# Patient Record
Sex: MALE | Race: WHITE | NOT HISPANIC OR LATINO | URBAN - METROPOLITAN AREA
[De-identification: names, ages, dates, MRNs, and addresses within clinical notes are randomized per-mention and may not be internally consistent; named-entity substitution may affect disease eponyms.]

---

## 2017-07-13 ENCOUNTER — IMPORTED ENCOUNTER (OUTPATIENT)
Dept: URBAN - METROPOLITAN AREA CLINIC 38 | Facility: CLINIC | Age: 37
End: 2017-07-13

## 2017-07-13 PROBLEM — H18.821 CORNEAL DISORDER DUE TO CONTACT LENS, RIGHT EYE: Noted: 2017-07-13

## 2017-07-13 PROCEDURE — 92012 INTRM OPH EXAM EST PATIENT: CPT

## 2017-07-24 ENCOUNTER — IMPORTED ENCOUNTER (OUTPATIENT)
Dept: URBAN - METROPOLITAN AREA CLINIC 38 | Facility: CLINIC | Age: 37
End: 2017-07-24

## 2017-07-24 PROBLEM — H52.13 MYOPIA, BILATERAL: Noted: 2017-07-24

## 2017-07-24 PROCEDURE — 92015 DETERMINE REFRACTIVE STATE: CPT

## 2020-01-20 ENCOUNTER — IMPORTED ENCOUNTER (OUTPATIENT)
Dept: URBAN - METROPOLITAN AREA CLINIC 38 | Facility: CLINIC | Age: 40
End: 2020-01-20

## 2020-01-20 PROBLEM — H52.223 REGULAR ASTIGMATISM, BILATERAL: Noted: 2020-01-20

## 2020-01-20 PROCEDURE — 92015 DETERMINE REFRACTIVE STATE: CPT

## 2020-08-12 ENCOUNTER — IMPORTED ENCOUNTER (OUTPATIENT)
Dept: URBAN - METROPOLITAN AREA CLINIC 38 | Facility: CLINIC | Age: 40
End: 2020-08-12

## 2020-08-12 PROBLEM — H10.45 OTHER CHRONIC ALLERGIC CONJUNCTIVITIS: Noted: 2020-08-12

## 2020-08-12 PROCEDURE — 92012 INTRM OPH EXAM EST PATIENT: CPT

## 2020-09-28 ENCOUNTER — IMPORTED ENCOUNTER (OUTPATIENT)
Dept: URBAN - METROPOLITAN AREA CLINIC 38 | Facility: CLINIC | Age: 40
End: 2020-09-28

## 2020-09-28 PROBLEM — H18.821 CORNEAL DISORDER DUE TO CONTACT LENS, RIGHT EYE: Noted: 2020-09-28

## 2020-09-28 PROCEDURE — 92012 INTRM OPH EXAM EST PATIENT: CPT

## 2020-10-12 ENCOUNTER — IMPORTED ENCOUNTER (OUTPATIENT)
Dept: URBAN - METROPOLITAN AREA CLINIC 38 | Facility: CLINIC | Age: 40
End: 2020-10-12

## 2020-10-12 PROBLEM — H18.821 CORNEAL DISORDER DUE TO CONTACT LENS, RIGHT EYE: Noted: 2020-10-12

## 2020-10-12 PROCEDURE — 92012 INTRM OPH EXAM EST PATIENT: CPT

## 2020-11-12 ENCOUNTER — IMPORTED ENCOUNTER (OUTPATIENT)
Dept: URBAN - METROPOLITAN AREA CLINIC 38 | Facility: CLINIC | Age: 40
End: 2020-11-12

## 2020-11-12 PROBLEM — H18.821 CORNEAL DISORDER DUE TO CONTACT LENS, RIGHT EYE: Noted: 2020-11-12

## 2020-11-12 PROCEDURE — 92012 INTRM OPH EXAM EST PATIENT: CPT

## 2021-05-03 ENCOUNTER — IMPORTED ENCOUNTER (OUTPATIENT)
Dept: URBAN - METROPOLITAN AREA CLINIC 38 | Facility: CLINIC | Age: 41
End: 2021-05-03

## 2021-05-03 PROBLEM — H52.223 REGULAR ASTIGMATISM, BILATERAL: Noted: 2021-05-03

## 2021-05-03 PROCEDURE — 92015 DETERMINE REFRACTIVE STATE: CPT

## 2022-06-18 ASSESSMENT — VISUAL ACUITY
OD_CC: 20/20-1
OD_CC: 20/25
OS_CC: 20/20-
OD_CC: 20/25-1
OS_CC: 20/20
OS_CC: 20/20-1
OS_CC: 20/20-1
OS_CC: 20/20
OS_CC: J1
OD_CC: J1
OS_CC: 20/25+
OS_CC: 20/20
OD_CC: 20/20-
OD_CC: 20/20
OS_CC: J1
OS_CC: 20/25+
OD_CC: 20/25+
OD_CC: 20/20
OD_CC: 20/20
OS_CC: J1
OD_CC: J2
OD_CC: J1

## 2022-06-18 ASSESSMENT — TONOMETRY
OS_IOP_MMHG: 14
OD_IOP_MMHG: 18
OD_IOP_MMHG: 16
OS_IOP_MMHG: 15
OS_IOP_MMHG: 16
OD_IOP_MMHG: 17
OS_IOP_MMHG: 17
OS_IOP_MMHG: 19
OD_IOP_MMHG: 16
OD_IOP_MMHG: 15
OS_IOP_MMHG: 14
OS_IOP_MMHG: 18
OD_IOP_MMHG: 15
OD_IOP_MMHG: 13
OS_IOP_MMHG: 18
OD_IOP_MMHG: 16

## 2022-06-18 ASSESSMENT — KERATOMETRY
OS_K2POWER_DIOPTERS: 42.75
OS_AXISANGLE2_DEGREES: 82
OD_AXISANGLE2_DEGREES: 103
OD_AXISANGLE2_DEGREES: 107
OD_K1POWER_DIOPTERS: 41.75
OD_K2POWER_DIOPTERS: 42.25
OD_K1POWER_DIOPTERS: 41.50
OS_AXISANGLE2_DEGREES: 82
OS_K1POWER_DIOPTERS: 41.75
OD_K1POWER_DIOPTERS: 41.50
OS_AXISANGLE_DEGREES: 172
OD_AXISANGLE_DEGREES: 15
OS_K2POWER_DIOPTERS: 43.25
OS_AXISANGLE_DEGREES: 170
OD_AXISANGLE2_DEGREES: 105
OS_AXISANGLE2_DEGREES: 80
OD_AXISANGLE_DEGREES: 13
OS_AXISANGLE_DEGREES: 172
OD_K2POWER_DIOPTERS: 42.50
OS_K1POWER_DIOPTERS: 42.00
OS_K1POWER_DIOPTERS: 42.00
OS_K2POWER_DIOPTERS: 42.75
OD_AXISANGLE_DEGREES: 17
OD_K2POWER_DIOPTERS: 42.75

## 2023-02-03 ENCOUNTER — PROBLEM (OUTPATIENT)
Dept: URBAN - METROPOLITAN AREA CLINIC 68 | Facility: CLINIC | Age: 43
End: 2023-02-03

## 2023-02-03 DIAGNOSIS — L71.9: ICD-10-CM

## 2023-02-03 PROCEDURE — 92012 INTRM OPH EXAM EST PATIENT: CPT

## 2023-02-03 ASSESSMENT — VISUAL ACUITY
OS_CC: 20/25-1
OD_CC: 20/20

## 2023-02-03 ASSESSMENT — KERATOMETRY
OS_K1POWER_DIOPTERS: 41.75
OS_K2POWER_DIOPTERS: 42.75
OS_AXISANGLE_DEGREES: 172
OS_AXISANGLE2_DEGREES: 82
OD_K2POWER_DIOPTERS: 42.25
OD_AXISANGLE_DEGREES: 17
OD_K1POWER_DIOPTERS: 41.50
OD_AXISANGLE2_DEGREES: 107

## 2023-02-03 ASSESSMENT — TONOMETRY
OD_IOP_MMHG: 16
OS_IOP_MMHG: 18

## 2023-05-05 ENCOUNTER — ESTABLISHED COMPREHENSIVE EXAM (OUTPATIENT)
Dept: URBAN - METROPOLITAN AREA CLINIC 68 | Facility: CLINIC | Age: 43
End: 2023-05-05

## 2023-05-05 DIAGNOSIS — H52.13: ICD-10-CM

## 2023-05-05 DIAGNOSIS — H52.223: ICD-10-CM

## 2023-05-05 PROCEDURE — 92015 DETERMINE REFRACTIVE STATE: CPT

## 2023-05-05 PROCEDURE — 92310 CONTACT LENS FITTING OU: CPT

## 2023-05-05 PROCEDURE — 92014 COMPRE OPH EXAM EST PT 1/>: CPT

## 2023-05-05 ASSESSMENT — TONOMETRY
OD_IOP_MMHG: 14
OS_IOP_MMHG: 18

## 2023-05-05 ASSESSMENT — VISUAL ACUITY
OS_CC: 20/20
OS_CC: J1
OD_CC: J1
OD_CC: 20/20-1

## 2024-11-07 ENCOUNTER — ANESTHESIA EVENT (OUTPATIENT)
Dept: ENDOSCOPY | Facility: HOSPITAL | Age: 44
End: 2024-11-07
Payer: COMMERCIAL

## 2024-11-07 NOTE — ANESTHESIA PREPROCEDURE EVALUATION
Relevant Problems   No relevant active problems       ROS/Med Hx     No past surgical history on file.    Physical Exam    Anesthesia Plan    Plan: MAC       2 ASA

## 2024-11-08 ENCOUNTER — HOSPITAL ENCOUNTER (OUTPATIENT)
Facility: HOSPITAL | Age: 44
Discharge: HOME | End: 2024-11-08
Attending: COLON & RECTAL SURGERY | Admitting: COLON & RECTAL SURGERY
Payer: COMMERCIAL

## 2024-11-08 ENCOUNTER — ANESTHESIA (OUTPATIENT)
Dept: ENDOSCOPY | Facility: HOSPITAL | Age: 44
End: 2024-11-08
Payer: COMMERCIAL

## 2024-11-08 VITALS
OXYGEN SATURATION: 100 % | TEMPERATURE: 98.7 F | DIASTOLIC BLOOD PRESSURE: 66 MMHG | RESPIRATION RATE: 18 BRPM | SYSTOLIC BLOOD PRESSURE: 142 MMHG | BODY MASS INDEX: 28.14 KG/M2 | HEART RATE: 56 BPM | HEIGHT: 71 IN | WEIGHT: 201 LBS

## 2024-11-08 PROCEDURE — 63600000 HC DRUGS/DETAIL CODE: Mod: JZ,TB

## 2024-11-08 PROCEDURE — 37000002 HC ANESTHESIA MAC: Performed by: COLON & RECTAL SURGERY

## 2024-11-08 PROCEDURE — 25800000 HC PHARMACY IV SOLUTIONS

## 2024-11-08 PROCEDURE — 71000001 HC PACU PHASE 1 INITIAL 30MIN: Performed by: COLON & RECTAL SURGERY

## 2024-11-08 PROCEDURE — 71000011 HC PACU PHASE 1 EA ADDL MIN: Performed by: COLON & RECTAL SURGERY

## 2024-11-08 PROCEDURE — 0WHP8YZ INSERTION OF OTHER DEVICE INTO GASTROINTESTINAL TRACT, VIA NATURAL OR ARTIFICIAL OPENING ENDOSCOPIC: ICD-10-PCS | Performed by: COLON & RECTAL SURGERY

## 2024-11-08 PROCEDURE — 25000000 HC PHARMACY GENERAL

## 2024-11-08 PROCEDURE — 75000014 HC COLONSCOPY DIAGNOSTIC: Performed by: COLON & RECTAL SURGERY

## 2024-11-08 RX ORDER — PROPOFOL 10 MG/ML
INJECTION, EMULSION INTRAVENOUS CONTINUOUS PRN
Status: DISCONTINUED | OUTPATIENT
Start: 2024-11-08 | End: 2024-11-08 | Stop reason: SURG

## 2024-11-08 RX ORDER — MULTIVITAMIN
1 TABLET ORAL DAILY
COMMUNITY

## 2024-11-08 RX ORDER — PROPOFOL 200MG/20ML
SYRINGE (ML) INTRAVENOUS AS NEEDED
Status: DISCONTINUED | OUTPATIENT
Start: 2024-11-08 | End: 2024-11-08 | Stop reason: SURG

## 2024-11-08 RX ORDER — LIDOCAINE HYDROCHLORIDE 10 MG/ML
INJECTION, SOLUTION EPIDURAL; INFILTRATION; INTRACAUDAL; PERINEURAL AS NEEDED
Status: DISCONTINUED | OUTPATIENT
Start: 2024-11-08 | End: 2024-11-08 | Stop reason: SURG

## 2024-11-08 RX ORDER — SODIUM CHLORIDE 9 MG/ML
INJECTION, SOLUTION INTRAVENOUS CONTINUOUS
Status: DISCONTINUED | OUTPATIENT
Start: 2024-11-08 | End: 2024-11-08 | Stop reason: HOSPADM

## 2024-11-08 RX ORDER — SODIUM CHLORIDE 9 MG/ML
INJECTION, SOLUTION INTRAVENOUS CONTINUOUS PRN
Status: DISCONTINUED | OUTPATIENT
Start: 2024-11-08 | End: 2024-11-08 | Stop reason: SURG

## 2024-11-08 RX ADMIN — PROPOFOL 80 MG: 10 INJECTION, EMULSION INTRAVENOUS at 07:43

## 2024-11-08 RX ADMIN — GLUCAGON 1 MG: KIT at 07:53

## 2024-11-08 RX ADMIN — PROPOFOL 100 MG: 10 INJECTION, EMULSION INTRAVENOUS at 07:39

## 2024-11-08 RX ADMIN — PROPOFOL 150 MCG/KG/MIN: 10 INJECTION, EMULSION INTRAVENOUS at 07:39

## 2024-11-08 RX ADMIN — SODIUM CHLORIDE: 9 INJECTION, SOLUTION INTRAVENOUS at 07:36

## 2024-11-08 RX ADMIN — LIDOCAINE HYDROCHLORIDE 5 ML: 10 INJECTION, SOLUTION EPIDURAL; INFILTRATION; INTRACAUDAL; PERINEURAL at 07:39

## 2024-11-08 NOTE — ANESTHESIOLOGIST PRE-PROCEDURE ATTESTATION
Pre-Procedure Patient Identification:  I am the Primary Anesthesiologist and have identified the patient on 11/08/24 at 7:03 AM.   I have confirmed the procedure(s) will be performed by the following surgeon/proceduralist Bam Can DO.

## 2024-11-08 NOTE — OP NOTE
_______________________________________________________________________________  Patient Name: Humberto Jackman           Procedure Date: 11/8/2024 7:09 AM  MRN: [id number]                     Account Number: [id number]  YOB: 1980              Age: 44  Gender: Male                          Note Status: Finalized  Attending MD: HEATHER AMAYA DO,  _______________________________________________________________________________  Procedure:             Colonoscopy  Indications:           High risk colon cancer surveillance: Personal history  of colonic polyps, Incidental - Rectal bleeding  Providers:             HEATHER AMAYA DO (Doctor)  Referring MD:          CHARITY WATT DO  Requesting Provider:  Medicines:             See the Anesthesia note for documentation of the  administered medications  Complications:         No immediate complications.  _______________________________________________________________________________  Procedure:             After I obtained informed consent, the scope was  passed under direct vision. Throughout the procedure,  the patient's blood pressure, pulse, and oxygen  saturations were monitored continuously. The  colonoscope was introduced through the anus and  advanced to the cecum, identified by appendiceal  orifice and ileocecal valve. The colonoscopy was  performed without difficulty. The patient tolerated  the procedure well. The quality of the bowel  preparation was good.  Findings:  Hemorrhoids were found on perianal exam.  The exam was otherwise without abnormality.  Impression:            - Hemorrhoids found on perianal exam.  - The examination was otherwise normal.  - No specimens collected.  Recommendation:        - Discharge patient to home (ambulatory).  - Repeat colonoscopy in 5 years for surveillance.  - Return to my office in 1 week.  Procedure Code(s):     --- Professional ---  80810, Colonoscopy, flexible; diagnostic,  including  collection of specimen(s) by brushing or washing, when  performed (separate procedure)  Diagnosis Code(s):     --- Professional ---  Z86.010, Personal history of colonic polyps  K64.9, Unspecified hemorrhoids  CPT copyright 2023 [employer]. All rights reserved.  The codes documented in this report are preliminary and upon  review may  be revised to meet current compliance requirements.  Attending Participation:  I personally performed the entire procedure.  Bam Amaya DO  _____________________  BAM AMAYA DO  11/8/2024 8:16:46 AM  This report has been signed electronically.  Number of Addenda: 0  Note Initiated On: 11/8/2024 7:09 AM

## 2024-11-08 NOTE — ANESTHESIA POSTPROCEDURE EVALUATION
Patient: Humberto Jackman    Procedure Summary       Date: 11/08/24 Room / Location: [facility] (D) / [facility]    Anesthesia Start: 0736 Anesthesia Stop: 0800    Procedure: Colonoscopy (Anus) Diagnosis:       Rectal bleeding      Colon polyp      (Rectal bleeding [K62.5])      (Colon polyp [K63.5])    Providers: Bam Can DO Responsible Provider: Raciel Phillips MD    Anesthesia Type: MAC ASA Status: 2            Anesthesia Type: MAC  PACU Vitals  11/8/2024 0800 - 11/8/2024 0900        11/8/2024  0800 11/8/2024  0810 11/8/2024  0820 11/8/2024  0830    BP: 118/57 110/59 112/58 142/66    Temp: 37.1 °C (98.7 °F) -- -- --    Pulse: 73 62 56 56    Resp: 18 18 16 18    SpO2: 100 % 98 % 99 % 100 %              Anesthesia Post Evaluation    Pain management: adequate  Patient participation: complete - patient participated  Level of consciousness: awake and alert  Cardiovascular status: acceptable  Airway Patency: adequate  Respiratory status: acceptable  Hydration status: acceptable  Anesthetic complications: no

## 2024-11-08 NOTE — OR SURGEON
Pre-Procedure patient identification:  I am the primary operating surgeon/proceduralist and I have reviewed the applicable pathology reports and radiology studies for this procedure. I have identified the patient on 11/08/24 at 7:38 AM Bam Can DO  Phone Number: [phone]

## 2024-11-08 NOTE — DISCHARGE INSTRUCTIONS
Diet Instruction Sheet  Follow diet for two weeks.    Your high bulk, low spice diet is easy to follow!    Eat your meals at regular intervals.  Chew all food well.  Eat slowly.  Drink six to 8 glasses of fluid daily.  (If permitted by your family physician.)  Take 1 tablespoon brand name  sugar-free Metamucil in a full glass of fluid  once a day  If you do not like Metamucil you may substitute FiberCon, Hydrocil or Konsyl    AVOID the following foods:    A.  Avoid spicy foods and garlic  B.  Avoid onions, pedicles, ketchup, relish, tomatoes products, sauces, rashes,      corn           C.  Avoid products containing caffeine and cola products  D.  Avoid foods that cannot be digested easily such as popcorn, peanuts, seeds  E.  Avoid coffee maximum of 1 cup of decaffeinated coffee daily  F.  Avoid citrus and juices  G.  No Chocolate  H.  No alcohol products no beer

## 2025-05-08 ENCOUNTER — ESTABLISHED COMPREHENSIVE EXAM (OUTPATIENT)
Dept: URBAN - METROPOLITAN AREA CLINIC 68 | Facility: CLINIC | Age: 45
End: 2025-05-08

## 2025-05-08 DIAGNOSIS — H52.13: ICD-10-CM

## 2025-05-08 DIAGNOSIS — H52.223: ICD-10-CM

## 2025-05-08 PROCEDURE — 92014 COMPRE OPH EXAM EST PT 1/>: CPT

## 2025-05-08 PROCEDURE — 92310 CONTACT LENS FITTING OU: CPT

## 2025-05-08 ASSESSMENT — VISUAL ACUITY
OD_CC: J4
OD_CC: 20/20
OS_CC: J2
OS_CC: 20/20

## 2025-05-08 ASSESSMENT — TONOMETRY
OS_IOP_MMHG: 18
OD_IOP_MMHG: 20

## (undated) RX ORDER — NEOMYCIN SULFATE, POLYMYXIN B SULFATE AND DEXAMETHASONE 3.5; 10000; 1 MG/G; [USP'U]/G; MG/G: 1/4 OINTMENT OPHTHALMIC TWICE A DAY